# Patient Record
Sex: FEMALE | Employment: PART TIME | URBAN - METROPOLITAN AREA
[De-identification: names, ages, dates, MRNs, and addresses within clinical notes are randomized per-mention and may not be internally consistent; named-entity substitution may affect disease eponyms.]

---

## 2022-09-09 PROBLEM — C53.9 CERVICAL CANCER (HCC): Status: ACTIVE | Noted: 2022-09-09

## 2022-09-12 ENCOUNTER — HOSPITAL ENCOUNTER (OUTPATIENT)
Dept: RADIATION ONCOLOGY | Age: 42
Discharge: HOME | End: 2022-09-12
Payer: COMMERCIAL

## 2022-09-12 VITALS
SYSTOLIC BLOOD PRESSURE: 114 MMHG | DIASTOLIC BLOOD PRESSURE: 70 MMHG | HEART RATE: 67 BPM | TEMPERATURE: 96.8 F | RESPIRATION RATE: 16 BRPM | OXYGEN SATURATION: 98 %

## 2022-09-12 DIAGNOSIS — C53.9 MALIGNANT NEOPLASM OF CERVIX, UNSPECIFIED SITE (HCC): ICD-10-CM

## 2022-09-12 PROCEDURE — 99204 OFFICE O/P NEW MOD 45 MIN: CPT | Performed by: RADIOLOGY

## 2022-09-12 RX ORDER — DOCUSATE SODIUM 100 MG/1
1 CAPSULE, LIQUID FILLED ORAL 2 TIMES DAILY
COMMUNITY
Start: 2022-08-18

## 2022-09-12 RX ORDER — ESCITALOPRAM OXALATE 10 MG/1
10 TABLET ORAL DAILY
COMMUNITY
Start: 2022-09-08

## 2022-09-12 RX ORDER — ENOXAPARIN SODIUM 100 MG/ML
40 INJECTION SUBCUTANEOUS DAILY
COMMUNITY
Start: 2022-08-18

## 2022-09-12 RX ORDER — FAMOTIDINE 20 MG/1
20 TABLET, FILM COATED ORAL 2 TIMES DAILY
COMMUNITY

## 2022-09-12 RX ORDER — GABAPENTIN 100 MG/1
100 CAPSULE ORAL
COMMUNITY
Start: 2022-09-08 | End: 2022-10-08

## 2022-09-12 RX ORDER — IBUPROFEN 600 MG/1
600 TABLET ORAL 2 TIMES DAILY PRN
COMMUNITY
Start: 2022-09-08

## 2022-09-12 ASSESSMENT — ENCOUNTER SYMPTOMS
ANAL BLEEDING: 0
BLOOD IN STOOL: 0
VOMITING: 0
COUGH: 0
RECTAL PAIN: 0
ABDOMINAL PAIN: 1
DIARRHEA: 0
SHORTNESS OF BREATH: 1
ABDOMINAL DISTENTION: 0
CONSTIPATION: 1
NAUSEA: 1

## 2022-09-12 NOTE — PROGRESS NOTES
[address] [facility] [address], [address]        GALILEA OCONNELLZULEMA GOMEZ II.VIERTEL,  [facility]        Kathrin Núñez: [phone]        F: [phone]       [email or web address]            INITIAL CONSULTATION    Date of Service: 2022  Patient ID: Junior Ojeda   : 1980  MRN: [id number]   Acct Number: [de-identified]         Requesting Provider:  Dr. Naina Villaseñor [facility])  Reason for request: Evaluation for the potential role of adjuvant radiation therapy. CONSULTANT: Rehana Davis MD MS    CHIEF COMPLAINT: Evaluation for the potential role of adjuvant radiation therapy. ASSESSMENT:  Cancer Staging  Cervical cancer [facility])  Staging form: Cervix Uteri, AJCC Version 9  - Pathologic stage from 2022: Stage IB2 (pT1b2, pN0, cM0) - Unsigned      PLAN:  With regards to radiation to the pelvis, I discussed the possible short-term side effects of skin irritation (causing redness, dryness, or peeling), tiredness, diarrhea, rectal bleeding, pain with urination, urgency and increased frequency of urination and blood in the urine. Possible long-term side effects discussed included damage to the bowel or intestines (resulting in bleeding or obstruction that may require surgery), damage to the bladder (resulting in decreased capacity and bleeding that may require surgery), shrinkage and dryness of the vagina, damage to bone, and infertility. Junior Ojeda presents today for consultation regarding adjuvant radiation therapy for her cervical cancer. The patient underwent hysterectomy and salpingectomy with lymph node excision and ovarian transposition three weeks ago. [facility] tumor board recommended adjuvant radiation therapy. We reviewed imaging, labs, surgical reports, and notes.  We would plan to proceed with radiation due to the higher risk features noted in her surgical report (of note; the patient received an addendum to her surgical pathology report which noted the presence of lymphovascular invasion; she showed us this addendum on her MyChart). We would plan for approximately 45 Gy divided into 25 fractions. The patient is agreeable to proceed with radiation therapy. We discussed the importance of healthy diet through radiation treatment. We will plan for CT simulation with pelvic examination later this week. We discussed the risks, benefits, and rationale for proceeding with radiation therapy and all of their questions and concerns were answered and addressed to their satisfaction. Consent was signed at today's visit with CT simulation for treatment planning to be performed in the next 1-2 weeks. They have our clinic number to call with any questions or concerns if they were to have any prior to next visit. Thank you for allowing my assistance in the care of your patient. HISTORY OF PRESENT ILLNESS:  Oncology History Overview Note   Lucille Lanza is a 39 y.o. female    22 As per Dr. Mendy Stanley [facility]) note,          IMAGIN/5/22 PET/CT         Cervical cancer ([facility] [address]   2022 Surgery    Cervical biopsy         2022 Surgery    Curretage         2022 Surgery    Hysterectomy and salpingectomy, with ovarian transposition           2022 Initial Diagnosis    Cervical cancer ([address]         Ms. Lucille Lanza is a 39 y.o. female with above mentioned oncologic history presenting today for initial consultation regarding the potential role for radiation therapy. Review of Systems   Constitutional:  Positive for activity change, appetite change and fatigue. Negative for unexpected weight change. Respiratory:  Positive for shortness of breath (with exertion). Negative for cough. Cardiovascular:  Negative for chest pain. Gastrointestinal:  Positive for abdominal pain, constipation and nausea. Negative for abdominal distention, anal bleeding, blood in stool, diarrhea, rectal pain and vomiting.    Genitourinary:  Positive for dysuria (not burning but surgical pain). Negative for frequency, hematuria, pelvic pain, urgency, vaginal bleeding, vaginal discharge and vaginal pain. Skin:  Positive for wound. Neurological:  Positive for numbness (around incision). Negative for dizziness, weakness and headaches. A complete review of systems was performed and found to be negative except as presented above. Advance Directives       Power of  Living Will ACP-Advance Directive ACP-Power of     Not on File Not on File Not on File Not on File            Chaperone: No    Mediport: no    Pacemaker/ICD: no    Previous XRT: no    PAIN: 0/10    ADDITIONAL COMMENTS: Has discomfort but not pain in ABD where incision is, medication helps. PHYSICAL EXAMINATION:     VITAL SIGNS: /70   Pulse 67   Temp 96.8 °F (36 °C) (Infrared)   Resp 16   SpO2 98%     ECO - Asymptomatic (Fully active, able to carry on all pre-disease activities without restriction)    Physical Exam  Constitutional:       General: She is not in acute distress. Appearance: Normal appearance. HENT:      Head: Normocephalic and atraumatic. Cardiovascular:      Rate and Rhythm: Normal rate and regular rhythm. Pulmonary:      Effort: Pulmonary effort is normal. No respiratory distress. Abdominal:      General: Abdomen is flat. There is no distension. Palpations: Abdomen is soft. Musculoskeletal:      Right lower leg: No edema. Left lower leg: No edema. Skin:     Comments: 5 mm round, shallow opening of the most inferior portion of vertical surgical wound of the abdomen and pelvis. No drainage or surrounding erythema. Surgical scar otherwise well-healed. Neurological:      Mental Status: She is alert and oriented to person, place, and time.    Gyn: Deferred until day of CT simulation for treatment planning    Past Medical History:   Diagnosis Date    Cancer [facility])     Depression        Past Surgical History:   Procedure Laterality Date    DILATION AND CURETTAGE OF UTERUS      HYSTERECTOMY (CERVIX STATUS UNKNOWN)      TUBAL LIGATION  2010       Family History   Problem Relation Age of Onset    Vision Loss Mother     Obesity Mother     Depression Mother     Coronary Art Dis Mother     Atrial Fibrillation Mother     Arrhythmia Mother     Anemia Mother     Heart Disease Mother     Heart Attack Mother     Osteoporosis Father     Coronary Art Dis Father     Other Father         COPD    Heart Attack Father     Heart Disease Father     Cancer Maternal Aunt         Kidney and Cervical    Breast Cancer Maternal Aunt        Social History     Socioeconomic History    Marital status:      Spouse name: Anali Willingham    Number of children: 4    Years of education: Not on file    Highest education level: Not on file   Occupational History    Not on file   Tobacco Use    Smoking status: Former     Packs/day: 0.50     Years: 10.00     Pack years: 5.00     Types: Cigarettes     Quit date:      Years since quittin.7    Smokeless tobacco: Never   Vaping Use    Vaping Use: Never used   Substance and Sexual Activity    Alcohol use: Not Currently     Comment: rarely    Drug use: Never    Sexual activity: Not on file   Other Topics Concern    Not on file   Social History Narrative    Not on file     Social Determinants of Health     Financial Resource Strain: Not on file   Food Insecurity: Not on file   Transportation Needs: Not on file   Physical Activity: Not on file   Stress: Not on file   Social Connections: Not on file   Intimate Partner Violence: Not on file   Housing Stability: Not on file       No Known Allergies     Current Outpatient Medications   Medication Sig Dispense Refill    enoxaparin (LOVENOX) 40 MG/0.4ML Inject 40 mg into the skin daily      gabapentin (NEURONTIN) 100 MG capsule Take 100 mg by mouth.       ibuprofen (ADVIL;MOTRIN) 600 MG tablet Take 600 mg by mouth 2 times daily as needed      famotidine (PEPCID) 20 MG tablet Take 20 mg by mouth 2 times daily      docusate sodium (COLACE) 100 MG capsule Take 1 capsule by mouth in the morning and at bedtime      escitalopram (LEXAPRO) 10 MG tablet Take 10 mg by mouth daily       No current facility-administered medications for this encounter. Outpatient Medications Marked as Taking for the 9/12/22 encounter [facility]OSPITAL Encounter) with Margo Jarrell MD   Medication Sig Dispense Refill    enoxaparin (LOVENOX) 40 MG/0.4ML Inject 40 mg into the skin daily      gabapentin (NEURONTIN) 100 MG capsule Take 100 mg by mouth. ibuprofen (ADVIL;MOTRIN) 600 MG tablet Take 600 mg by mouth 2 times daily as needed         LABORATORY STUDIES:   Onc labs: No results found for: PSA, CEA, LDH, AFP    No results found for: CREATININE  No results found for: BUN    PATHOLOGY: As per HPI above. RADIOLOGIC STUDIES: As per HPI above. Electronically signed by West Larsen MD MS on 9/12/22 at 10:46 AM EDT     ATTESTATION: 45 minutes were spent with the patient at today's visit reviewing pertinent information related to their oncologic diagnosis, including any recent labs, imaging, follow ups and plan of care going forward.     CC: Dr. Lorenzo Clement ([facility]), Dr. Estella Hodgson ([facility]), YUE Luna (PCP)    ACC:[facility]

## 2022-09-13 PROCEDURE — 77334 RADIATION TREATMENT AID(S): CPT | Performed by: RADIOLOGY

## 2022-09-13 PROCEDURE — 77290 THER RAD SIMULAJ FIELD CPLX: CPT | Performed by: RADIOLOGY

## 2022-09-13 PROCEDURE — 77263 THER RADIOLOGY TX PLNG CPLX: CPT | Performed by: RADIOLOGY

## 2022-09-26 PROCEDURE — 77301 RADIOTHERAPY DOSE PLAN IMRT: CPT | Performed by: RADIOLOGY

## 2022-09-26 PROCEDURE — 77338 DESIGN MLC DEVICE FOR IMRT: CPT | Performed by: RADIOLOGY

## 2022-09-26 PROCEDURE — 77300 RADIATION THERAPY DOSE PLAN: CPT | Performed by: RADIOLOGY

## 2022-09-27 PROCEDURE — 77014 PR CT GUIDANCE PLACEMENT RAD THERAPY FIELDS: CPT | Performed by: RADIOLOGY

## 2022-09-28 PROCEDURE — 77014 PR CT GUIDANCE PLACEMENT RAD THERAPY FIELDS: CPT | Performed by: RADIOLOGY

## 2022-09-29 PROCEDURE — 77014 PR CT GUIDANCE PLACEMENT RAD THERAPY FIELDS: CPT | Performed by: RADIOLOGY

## 2022-09-30 PROCEDURE — 77014 PR CT GUIDANCE PLACEMENT RAD THERAPY FIELDS: CPT | Performed by: RADIOLOGY

## 2022-10-03 PROCEDURE — 77427 RADIATION TX MANAGEMENT X5: CPT | Performed by: RADIOLOGY

## 2022-10-03 PROCEDURE — 77014 PR CT GUIDANCE PLACEMENT RAD THERAPY FIELDS: CPT | Performed by: RADIOLOGY

## 2022-10-04 PROCEDURE — 77014 PR CT GUIDANCE PLACEMENT RAD THERAPY FIELDS: CPT | Performed by: RADIOLOGY

## 2022-10-05 PROCEDURE — 77014 PR CT GUIDANCE PLACEMENT RAD THERAPY FIELDS: CPT | Performed by: RADIOLOGY

## 2022-10-06 PROCEDURE — 77014 PR CT GUIDANCE PLACEMENT RAD THERAPY FIELDS: CPT | Performed by: RADIOLOGY

## 2022-10-07 PROCEDURE — 77014 PR CT GUIDANCE PLACEMENT RAD THERAPY FIELDS: CPT | Performed by: RADIOLOGY

## 2022-10-10 PROCEDURE — 77014 PR CT GUIDANCE PLACEMENT RAD THERAPY FIELDS: CPT | Performed by: RADIOLOGY

## 2022-10-10 PROCEDURE — 77427 RADIATION TX MANAGEMENT X5: CPT | Performed by: RADIOLOGY

## 2022-10-11 PROCEDURE — 77014 PR CT GUIDANCE PLACEMENT RAD THERAPY FIELDS: CPT | Performed by: RADIOLOGY

## 2022-10-12 PROCEDURE — 77014 PR CT GUIDANCE PLACEMENT RAD THERAPY FIELDS: CPT | Performed by: RADIOLOGY

## 2022-10-13 PROCEDURE — 77014 PR CT GUIDANCE PLACEMENT RAD THERAPY FIELDS: CPT | Performed by: RADIOLOGY

## 2022-10-14 PROCEDURE — 77014 PR CT GUIDANCE PLACEMENT RAD THERAPY FIELDS: CPT | Performed by: RADIOLOGY

## 2022-10-17 PROCEDURE — 77427 RADIATION TX MANAGEMENT X5: CPT | Performed by: RADIOLOGY

## 2022-10-17 PROCEDURE — 77014 PR CT GUIDANCE PLACEMENT RAD THERAPY FIELDS: CPT | Performed by: RADIOLOGY

## 2022-10-18 PROCEDURE — 77014 PR CT GUIDANCE PLACEMENT RAD THERAPY FIELDS: CPT | Performed by: RADIOLOGY

## 2022-10-19 PROCEDURE — 77014 PR CT GUIDANCE PLACEMENT RAD THERAPY FIELDS: CPT | Performed by: RADIOLOGY

## 2022-10-20 PROCEDURE — 77014 PR CT GUIDANCE PLACEMENT RAD THERAPY FIELDS: CPT | Performed by: RADIOLOGY

## 2022-10-21 PROCEDURE — 77014 PR CT GUIDANCE PLACEMENT RAD THERAPY FIELDS: CPT | Performed by: RADIOLOGY

## 2022-10-24 PROCEDURE — 77014 PR CT GUIDANCE PLACEMENT RAD THERAPY FIELDS: CPT | Performed by: RADIOLOGY

## 2022-10-24 PROCEDURE — 77427 RADIATION TX MANAGEMENT X5: CPT | Performed by: RADIOLOGY

## 2022-10-25 PROCEDURE — 77014 PR CT GUIDANCE PLACEMENT RAD THERAPY FIELDS: CPT | Performed by: RADIOLOGY

## 2022-10-26 PROCEDURE — 77014 PR CT GUIDANCE PLACEMENT RAD THERAPY FIELDS: CPT | Performed by: RADIOLOGY

## 2022-10-27 PROCEDURE — 77014 PR CT GUIDANCE PLACEMENT RAD THERAPY FIELDS: CPT | Performed by: RADIOLOGY

## 2022-10-28 PROCEDURE — 77014 PR CT GUIDANCE PLACEMENT RAD THERAPY FIELDS: CPT | Performed by: RADIOLOGY

## 2022-10-31 PROCEDURE — 77014 PR CT GUIDANCE PLACEMENT RAD THERAPY FIELDS: CPT | Performed by: RADIOLOGY

## 2022-10-31 PROCEDURE — 77427 RADIATION TX MANAGEMENT X5: CPT | Performed by: RADIOLOGY
